# Patient Record
Sex: MALE | Race: WHITE | NOT HISPANIC OR LATINO | RURAL
[De-identification: names, ages, dates, MRNs, and addresses within clinical notes are randomized per-mention and may not be internally consistent; named-entity substitution may affect disease eponyms.]

---

## 2023-04-07 ENCOUNTER — LAB OUTSIDE AN ENCOUNTER (OUTPATIENT)
Dept: RURAL CLINIC 2 | Facility: CLINIC | Age: 68
End: 2023-04-07

## 2023-04-07 ENCOUNTER — OFFICE VISIT (OUTPATIENT)
Dept: RURAL CLINIC 2 | Facility: CLINIC | Age: 68
End: 2023-04-07
Payer: MEDICARE

## 2023-04-07 VITALS
SYSTOLIC BLOOD PRESSURE: 136 MMHG | HEART RATE: 62 BPM | HEIGHT: 60 IN | BODY MASS INDEX: 43.39 KG/M2 | WEIGHT: 221 LBS | TEMPERATURE: 97.1 F | DIASTOLIC BLOOD PRESSURE: 82 MMHG

## 2023-04-07 DIAGNOSIS — I48.91 ATRIAL FIBRILLATION, UNSPECIFIED TYPE: ICD-10-CM

## 2023-04-07 DIAGNOSIS — R10.13 EPIGASTRIC ABDOMINAL PAIN: ICD-10-CM

## 2023-04-07 DIAGNOSIS — K21.9 GASTROESOPHAGEAL REFLUX DISEASE, UNSPECIFIED WHETHER ESOPHAGITIS PRESENT: ICD-10-CM

## 2023-04-07 PROBLEM — 79922009: Status: ACTIVE | Noted: 2023-04-07

## 2023-04-07 PROBLEM — 49436004: Status: ACTIVE | Noted: 2023-04-07

## 2023-04-07 PROBLEM — 305058001: Status: ACTIVE | Noted: 2023-04-07

## 2023-04-07 PROBLEM — 235595009: Status: ACTIVE | Noted: 2023-04-07

## 2023-04-07 PROCEDURE — 99203 OFFICE O/P NEW LOW 30 MIN: CPT | Performed by: NURSE PRACTITIONER

## 2023-04-07 RX ORDER — DOFETILIDE 0.25 MG/1
1 CAPSULE CAPSULE ORAL TWICE A DAY
Status: ACTIVE | COMMUNITY

## 2023-04-07 RX ORDER — RIVAROXABAN 20 MG/1
1 TABLET WITH FOOD TABLET, FILM COATED ORAL ONCE A DAY
Status: ACTIVE | COMMUNITY

## 2023-04-07 RX ORDER — TAMSULOSIN HYDROCHLORIDE 0.4 MG/1
1 CAPSULE CAPSULE ORAL ONCE A DAY
Status: ACTIVE | COMMUNITY

## 2023-04-07 RX ORDER — PYRIDOSTIGMINE BROMIDE 60 MG/1
1 TABLET TABLET ORAL
Status: DISCONTINUED | COMMUNITY

## 2023-04-07 RX ORDER — METOPROLOL SUCCINATE 25 MG/1
1 TABLET TABLET, FILM COATED, EXTENDED RELEASE ORAL ONCE A DAY
Status: ACTIVE | COMMUNITY

## 2023-04-07 RX ORDER — OMEPRAZOLE 20 MG/1
1 CAPSULE 30 MINUTES BEFORE MORNING MEAL CAPSULE, DELAYED RELEASE ORAL ONCE A DAY
Status: ACTIVE | COMMUNITY

## 2023-04-07 RX ORDER — DIPHENHYDRAMINE HYDROCHLORIDE 50 MG/1
1 CAPSULE AT BEDTIME AS NEEDED CAPSULE ORAL ONCE A DAY
Status: ACTIVE | COMMUNITY

## 2023-04-07 RX ORDER — TADALAFIL 5 MG/1
1 TABLET AS NEEDED TABLET, FILM COATED ORAL ONCE A DAY
Status: ACTIVE | COMMUNITY

## 2023-04-07 RX ORDER — SIMVASTATIN 40 MG
1 TABLET IN THE EVENING TABLET ORAL ONCE A DAY
Status: ACTIVE | COMMUNITY

## 2023-04-07 NOTE — HPI-ZZZTODAY'S VISIT
The patient is a 68-year-old gentleman who presents on referral from Dr. John Painter for GERD and epigastric abdominal pain.  A copy of this document to be sent to the referring provider.  The patient reports waking up with epigastric abdominal pain, mild bloating as well as episodes occurring later in the day.  Prostigmin for MG was slowly reduced and now off the medication and his symptoms have improved however they are still continuing.  He underwent a CT scan of the abdomen and unrevealing.  He was also given a round of Flagyl without any effect.  He increased omeprazole to from 10 mg daily to 20 mg and has seen mild improvement.  He is also taking the medication every other day.  He denies dysphagia, nausea or vomiting.  He denies undergoing an upper endoscopy in the past.  He recalls undergoing a colonoscopy approximately 10 years ago with normal findings.  He has since then been completing Cologuard testing with negative results.  Discussed undergoing a colonoscopy at this time and would like to hold off for now.  Past medical history of atrial fibrillation on anticoagulation therapy.

## 2023-07-27 ENCOUNTER — CLAIMS CREATED FROM THE CLAIM WINDOW (OUTPATIENT)
Dept: RURAL MEDICAL CENTER 4 | Facility: MEDICAL CENTER | Age: 68
End: 2023-07-27
Payer: MEDICARE

## 2023-07-27 ENCOUNTER — OFFICE VISIT (OUTPATIENT)
Dept: RURAL MEDICAL CENTER 4 | Facility: MEDICAL CENTER | Age: 68
End: 2023-07-27

## 2023-07-27 ENCOUNTER — CLAIMS CREATED FROM THE CLAIM WINDOW (OUTPATIENT)
Dept: RURAL MEDICAL CENTER 4 | Facility: MEDICAL CENTER | Age: 68
End: 2023-07-27

## 2023-07-27 DIAGNOSIS — K31.89 ACQUIRED DEFORMITY OF DUODENUM: ICD-10-CM

## 2023-07-27 DIAGNOSIS — R10.13 EPIGASTRIC ABDOMINAL PAIN: ICD-10-CM

## 2023-07-27 DIAGNOSIS — K31.7 BENIGN GASTRIC POLYP: ICD-10-CM

## 2023-07-27 DIAGNOSIS — R10.13 ABDOMINAL DISCOMFORT, EPIGASTRIC: ICD-10-CM

## 2023-07-27 PROCEDURE — 43239 EGD BIOPSY SINGLE/MULTIPLE: CPT | Performed by: INTERNAL MEDICINE

## 2023-07-27 RX ORDER — TADALAFIL 5 MG/1
1 TABLET AS NEEDED TABLET, FILM COATED ORAL ONCE A DAY
Status: ACTIVE | COMMUNITY

## 2023-07-27 RX ORDER — TAMSULOSIN HYDROCHLORIDE 0.4 MG/1
1 CAPSULE CAPSULE ORAL ONCE A DAY
Status: ACTIVE | COMMUNITY

## 2023-07-27 RX ORDER — METOPROLOL SUCCINATE 25 MG/1
1 TABLET TABLET, FILM COATED, EXTENDED RELEASE ORAL ONCE A DAY
Status: ACTIVE | COMMUNITY

## 2023-07-27 RX ORDER — OMEPRAZOLE 20 MG/1
1 CAPSULE 30 MINUTES BEFORE MORNING MEAL CAPSULE, DELAYED RELEASE ORAL ONCE A DAY
Status: ACTIVE | COMMUNITY

## 2023-07-27 RX ORDER — SIMVASTATIN 40 MG
1 TABLET IN THE EVENING TABLET ORAL ONCE A DAY
Status: ACTIVE | COMMUNITY

## 2023-07-27 RX ORDER — DIPHENHYDRAMINE HYDROCHLORIDE 50 MG/1
1 CAPSULE AT BEDTIME AS NEEDED CAPSULE ORAL ONCE A DAY
Status: ACTIVE | COMMUNITY

## 2023-07-27 RX ORDER — DOFETILIDE 0.25 MG/1
1 CAPSULE CAPSULE ORAL TWICE A DAY
Status: ACTIVE | COMMUNITY

## 2023-07-27 RX ORDER — RIVAROXABAN 20 MG/1
1 TABLET WITH FOOD TABLET, FILM COATED ORAL ONCE A DAY
Status: ACTIVE | COMMUNITY

## 2023-11-10 ENCOUNTER — LAB OUTSIDE AN ENCOUNTER (OUTPATIENT)
Dept: RURAL CLINIC 2 | Facility: CLINIC | Age: 68
End: 2023-11-10

## 2023-11-10 ENCOUNTER — DASHBOARD ENCOUNTERS (OUTPATIENT)
Age: 68
End: 2023-11-10

## 2023-11-10 ENCOUNTER — OFFICE VISIT (OUTPATIENT)
Dept: RURAL CLINIC 2 | Facility: CLINIC | Age: 68
End: 2023-11-10
Payer: MEDICARE

## 2023-11-10 VITALS
DIASTOLIC BLOOD PRESSURE: 82 MMHG | BODY MASS INDEX: 41.43 KG/M2 | WEIGHT: 211 LBS | SYSTOLIC BLOOD PRESSURE: 125 MMHG | HEART RATE: 55 BPM | HEIGHT: 60 IN | TEMPERATURE: 97.1 F

## 2023-11-10 DIAGNOSIS — Z86.010 HISTORY OF COLON POLYPS: ICD-10-CM

## 2023-11-10 DIAGNOSIS — Z79.01 CHRONIC ANTICOAGULATION: ICD-10-CM

## 2023-11-10 DIAGNOSIS — I48.19 ATRIAL FIBRILLATION OTHER PERSISTENT: ICD-10-CM

## 2023-11-10 PROBLEM — 428283002: Status: ACTIVE | Noted: 2023-11-10

## 2023-11-10 PROBLEM — 711150003: Status: ACTIVE | Noted: 2023-11-10

## 2023-11-10 PROCEDURE — 99214 OFFICE O/P EST MOD 30 MIN: CPT | Performed by: NURSE PRACTITIONER

## 2023-11-10 RX ORDER — PYRIDOSTIGMINE BROMIDE 30 MG/1
1 TABLET TABLET ORAL
Status: ACTIVE | COMMUNITY

## 2023-11-10 RX ORDER — DIPHENHYDRAMINE HYDROCHLORIDE 50 MG/1
1 CAPSULE AT BEDTIME AS NEEDED CAPSULE ORAL ONCE A DAY
Status: ACTIVE | COMMUNITY

## 2023-11-10 RX ORDER — SODIUM, POTASSIUM,MAG SULFATES 17.5-3.13G
354 ML SOLUTION, RECONSTITUTED, ORAL ORAL 2
Qty: 1 | Refills: 0 | OUTPATIENT
Start: 2023-11-10 | End: 2023-11-11

## 2023-11-10 RX ORDER — TADALAFIL 5 MG/1
1 TABLET AS NEEDED TABLET, FILM COATED ORAL ONCE A DAY
Status: ACTIVE | COMMUNITY

## 2023-11-10 RX ORDER — DOFETILIDE 0.25 MG/1
1 CAPSULE CAPSULE ORAL TWICE A DAY
Status: ACTIVE | COMMUNITY

## 2023-11-10 RX ORDER — METOPROLOL SUCCINATE 25 MG/1
1 TABLET TABLET, FILM COATED, EXTENDED RELEASE ORAL ONCE A DAY
Status: ACTIVE | COMMUNITY

## 2023-11-10 RX ORDER — TAMSULOSIN HYDROCHLORIDE 0.4 MG/1
1 CAPSULE CAPSULE ORAL ONCE A DAY
Status: ACTIVE | COMMUNITY

## 2023-11-10 RX ORDER — SIMVASTATIN 40 MG
1 TABLET IN THE EVENING TABLET ORAL EVERY OTHER DAY
Status: ACTIVE | COMMUNITY

## 2023-11-10 RX ORDER — OMEPRAZOLE 20 MG/1
1 CAPSULE 30 MINUTES BEFORE MORNING MEAL CAPSULE, DELAYED RELEASE ORAL ONCE A DAY
Status: ACTIVE | COMMUNITY

## 2023-11-10 RX ORDER — OXYCODONE HYDROCHLORIDE AND ACETAMINOPHEN 5; 325 MG/1; MG/1
1 TABLET AS NEEDED TABLET ORAL
Status: DISCONTINUED | COMMUNITY

## 2023-11-10 RX ORDER — RIVAROXABAN 20 MG/1
1 TABLET WITH FOOD TABLET, FILM COATED ORAL ONCE A DAY
Status: ACTIVE | COMMUNITY

## 2023-11-10 NOTE — HPI-ZZZTODAY'S VISIT
The patient is a 68-year-old gentleman who presents on referral from Dr. John Painter for GERD and epigastric abdominal pain.  A copy of this document to be sent to the referring provider.  The patient reports waking up with epigastric abdominal pain, mild bloating as well as episodes occurring later in the day.  Prostigmin for MG was slowly reduced and now off the medication and his symptoms have improved however they are still continuing.  He underwent a CT scan of the abdomen and unrevealing.  He was also given a round of Flagyl without any effect.  He increased omeprazole to from 10 mg daily to 20 mg and has seen mild improvement.  He is also taking the medication every other day.  He denies dysphagia, nausea or vomiting.  He denies undergoing an upper endoscopy in the past.  He recalls undergoing a colonoscopy approximately 10 years ago with normal findings.  He has since then been completing Cologuard testing with negative results.  Discussed undergoing a colonoscopy at this time and would like to hold off for now.  Past medical history of atrial fibrillation on anticoagulation therapy. 11/10/2023 The patient is following up today to schedule a colonoscopy for history of colon polyps.  His last colonoscopy was completed 10 years ago.  He is currently feeling well and denies abdominal pain, constipation, diarrhea or rectal bleeding.  Past medical history is significant for atrial fibrillation, on anticoagulation therapy, myasthenia's gravis and sleep apnea.

## 2023-11-27 ENCOUNTER — TELEPHONE ENCOUNTER (OUTPATIENT)
Dept: RURAL CLINIC 2 | Facility: CLINIC | Age: 68
End: 2023-11-27

## 2023-11-30 ENCOUNTER — OFFICE VISIT (OUTPATIENT)
Dept: RURAL MEDICAL CENTER 4 | Facility: MEDICAL CENTER | Age: 68
End: 2023-11-30
Payer: MEDICARE

## 2023-11-30 DIAGNOSIS — D12.2 ADENOMA OF ASCENDING COLON: ICD-10-CM

## 2023-11-30 DIAGNOSIS — D12.0 ADENOMA OF CECUM: ICD-10-CM

## 2023-11-30 DIAGNOSIS — Z86.010 ADENOMAS PERSONAL HISTORY OF COLONIC POLYPS: ICD-10-CM

## 2023-11-30 PROCEDURE — 45380 COLONOSCOPY AND BIOPSY: CPT | Performed by: INTERNAL MEDICINE

## 2023-11-30 PROCEDURE — 45385 COLONOSCOPY W/LESION REMOVAL: CPT | Performed by: INTERNAL MEDICINE

## 2023-12-14 ENCOUNTER — OFFICE VISIT (OUTPATIENT)
Dept: RURAL MEDICAL CENTER 4 | Facility: MEDICAL CENTER | Age: 68
End: 2023-12-14

## 2025-05-16 NOTE — PHYSICAL EXAM EYES:
05/16/25 1007   Discharge Planning   Patient/Family Anticipates Transition to home with family   Concerns to be Addressed all concerns addressed in this encounter   Living Arrangements   People in Home alone   Type of Residence Private Residence   Is your private residence a single family home or apartment? Apartment   Number of Stairs, Within Home, Primary none   Stair Railings, Within Home, Primary none   Once home, are you able to live on one level? Yes   Which level? Main Level   Bathroom Shower/Tub Tub/Shower unit   Equipment Currently Used at Home shower chair;grab bar, toilet   Support System   Do you have someone available to stay with you one or two nights once you are home? Yes   Medical Clearance   Date of Physical 05/13/25   Clinic Name [facility]   It is recommended that you call and check with any specialty providers before surgery to see if you need surgical clearance.  Do you see any specialty providers outside of your primary care provider? No   Blood   Known Bleeding Disorder or Coagulopathy No   Does the patient have any Druze/cultural preferences related to blood products? No   Education   Has the patient scheduled or completed pre-op total joint education, either in class or online, in the last 12 months? Yes   What day did the patient complete, or plan to complete, pre-op total joint education? 04/28/25   Patient attended total joint pre-op class/received pre-op teaching  online   Falls Risk   Has the patient been identified as a high falls risk before surgery? (fallen in the last 6 months, history of falls, unsteady gait, or balance issues) No   Did an order get placed to attend outpatient pre-surgery balance evaluation? No        Conjuntivae and eyelids appear normal, Sclerae : White without injection